# Patient Record
Sex: FEMALE | Race: OTHER | URBAN - METROPOLITAN AREA
[De-identification: names, ages, dates, MRNs, and addresses within clinical notes are randomized per-mention and may not be internally consistent; named-entity substitution may affect disease eponyms.]

---

## 2023-02-23 ENCOUNTER — HOSPITAL ENCOUNTER (OUTPATIENT)
Dept: BONE DENSITY | Age: 78
Discharge: HOME | End: 2023-02-23
Attending: CHIROPRACTOR
Payer: MEDICARE

## 2023-02-23 DIAGNOSIS — Z78.0 POSTMENOPAUSAL STATE: ICD-10-CM

## 2023-02-23 PROCEDURE — 77080 DXA BONE DENSITY AXIAL: CPT | Performed by: CHIROPRACTOR

## 2023-02-25 ENCOUNTER — HOSPITAL ENCOUNTER (OUTPATIENT)
Dept: MAMMOGRAPHY | Age: 78
Discharge: HOME | End: 2023-02-25
Attending: CHIROPRACTOR
Payer: COMMERCIAL

## 2023-02-25 DIAGNOSIS — Z12.31 SCREENING MAMMOGRAM, ENCOUNTER FOR: ICD-10-CM

## 2023-02-25 PROCEDURE — 77067 SCR MAMMO BI INCL CAD: CPT | Performed by: CHIROPRACTOR

## 2023-02-25 PROCEDURE — 77063 BREAST TOMOSYNTHESIS BI: CPT | Performed by: CHIROPRACTOR

## 2023-12-21 ENCOUNTER — OFFICE VISIT (OUTPATIENT)
Dept: FAMILY MEDICINE CLINIC | Facility: CLINIC | Age: 78
End: 2023-12-21

## 2023-12-21 VITALS
DIASTOLIC BLOOD PRESSURE: 68 MMHG | WEIGHT: 144 LBS | SYSTOLIC BLOOD PRESSURE: 104 MMHG | BODY MASS INDEX: 30 KG/M2 | HEART RATE: 68 BPM

## 2023-12-21 DIAGNOSIS — R10.10 PAIN OF UPPER ABDOMEN: Primary | ICD-10-CM

## 2023-12-21 DIAGNOSIS — M81.0 OSTEOPOROSIS, UNSPECIFIED OSTEOPOROSIS TYPE, UNSPECIFIED PATHOLOGICAL FRACTURE PRESENCE: ICD-10-CM

## 2023-12-21 DIAGNOSIS — R68.81 EARLY SATIETY: ICD-10-CM

## 2023-12-21 DIAGNOSIS — R10.13 EPIGASTRIC PAIN: ICD-10-CM

## 2023-12-21 PROCEDURE — 3078F DIAST BP <80 MM HG: CPT | Performed by: FAMILY MEDICINE

## 2023-12-21 PROCEDURE — 3074F SYST BP LT 130 MM HG: CPT | Performed by: FAMILY MEDICINE

## 2023-12-21 PROCEDURE — 1160F RVW MEDS BY RX/DR IN RCRD: CPT | Performed by: FAMILY MEDICINE

## 2023-12-21 PROCEDURE — 1126F AMNT PAIN NOTED NONE PRSNT: CPT | Performed by: FAMILY MEDICINE

## 2023-12-21 PROCEDURE — 1159F MED LIST DOCD IN RCRD: CPT | Performed by: FAMILY MEDICINE

## 2023-12-21 PROCEDURE — 99204 OFFICE O/P NEW MOD 45 MIN: CPT | Performed by: FAMILY MEDICINE

## 2024-01-15 ENCOUNTER — HOSPITAL ENCOUNTER (OUTPATIENT)
Dept: ULTRASOUND IMAGING | Age: 79
Discharge: HOME | End: 2024-01-15
Attending: FAMILY MEDICINE
Payer: MEDICARE

## 2024-01-15 DIAGNOSIS — R10.10 PAIN OF UPPER ABDOMEN: ICD-10-CM

## 2024-01-15 PROCEDURE — 76705 ECHO EXAM OF ABDOMEN: CPT | Performed by: FAMILY MEDICINE

## 2024-01-22 ENCOUNTER — TELEPHONE (OUTPATIENT)
Dept: FAMILY MEDICINE CLINIC | Facility: CLINIC | Age: 79
End: 2024-01-22

## 2024-01-23 NOTE — TELEPHONE ENCOUNTER
I was going to discuss at the visit on 1/26 but her liver, bile ducts, pancreas and kidneys are normal she has a very small gallbladder polyp which should not be causing any pain.  Her gallbladder is noninflamed and there are no stones.

## 2024-01-23 NOTE — TELEPHONE ENCOUNTER
Pt  Nathaniel on LUDY was called and inform of Dr. Jones message below and he verbalized understanding. He will inform pt.      Future Appointments   Date Time Provider Department Center   1/26/2024  8:30 AM [facility]

## 2024-01-26 ENCOUNTER — LAB ENCOUNTER (OUTPATIENT)
Dept: LAB | Age: 79
End: 2024-01-26
Attending: FAMILY MEDICINE
Payer: MEDICARE

## 2024-01-26 ENCOUNTER — OFFICE VISIT (OUTPATIENT)
Dept: FAMILY MEDICINE CLINIC | Facility: CLINIC | Age: 79
End: 2024-01-26

## 2024-01-26 VITALS
DIASTOLIC BLOOD PRESSURE: 66 MMHG | HEART RATE: 64 BPM | WEIGHT: 143 LBS | BODY MASS INDEX: 30 KG/M2 | SYSTOLIC BLOOD PRESSURE: 116 MMHG

## 2024-01-26 DIAGNOSIS — H52.03 HYPEROPIA WITH PRESBYOPIA OF BOTH EYES: ICD-10-CM

## 2024-01-26 DIAGNOSIS — E55.9 VITAMIN D DEFICIENCY: ICD-10-CM

## 2024-01-26 DIAGNOSIS — M81.0 SENILE OSTEOPOROSIS: ICD-10-CM

## 2024-01-26 DIAGNOSIS — H25.9 SENILE CATARACT, UNSPECIFIED AGE-RELATED CATARACT TYPE, UNSPECIFIED LATERALITY: ICD-10-CM

## 2024-01-26 DIAGNOSIS — L98.9 SKIN LESION: ICD-10-CM

## 2024-01-26 DIAGNOSIS — R10.13 EPIGASTRIC PAIN: ICD-10-CM

## 2024-01-26 DIAGNOSIS — H52.4 HYPEROPIA WITH PRESBYOPIA OF BOTH EYES: ICD-10-CM

## 2024-01-26 DIAGNOSIS — Z00.00 ENCOUNTER FOR ANNUAL HEALTH EXAMINATION: ICD-10-CM

## 2024-01-26 DIAGNOSIS — Z23 NEED FOR PNEUMOCOCCAL VACCINE: ICD-10-CM

## 2024-01-26 DIAGNOSIS — H10.45 CHRONIC ALLERGIC CONJUNCTIVITIS: ICD-10-CM

## 2024-01-26 DIAGNOSIS — M54.50 LOW BACK PAIN, UNSPECIFIED BACK PAIN LATERALITY, UNSPECIFIED CHRONICITY, UNSPECIFIED WHETHER SCIATICA PRESENT: ICD-10-CM

## 2024-01-26 DIAGNOSIS — Z00.00 ENCOUNTER FOR ANNUAL HEALTH EXAMINATION: Primary | ICD-10-CM

## 2024-01-26 DIAGNOSIS — H35.359 CYSTOID MACULAR DEGENERATION, UNSPECIFIED LATERALITY: ICD-10-CM

## 2024-01-26 LAB
ALBUMIN SERPL-MCNC: 4.6 G/DL (ref 3.2–4.8)
ALBUMIN/GLOB SERPL: 1.5 {RATIO} (ref 1–2)
ALP LIVER SERPL-CCNC: 103 U/L
ALT SERPL-CCNC: 20 U/L
ANION GAP SERPL CALC-SCNC: 7 MMOL/L (ref 0–18)
AST SERPL-CCNC: 26 U/L (ref ?–34)
BASOPHILS # BLD AUTO: 0.06 X10(3) UL (ref 0–0.2)
BASOPHILS NFR BLD AUTO: 1 %
BILIRUB SERPL-MCNC: 0.8 MG/DL (ref 0.2–1.1)
BUN BLD-MCNC: 20 MG/DL (ref 9–23)
BUN/CREAT SERPL: 23.3 (ref 10–20)
CALCIUM BLD-MCNC: 9.7 MG/DL (ref 8.7–10.4)
CHLORIDE SERPL-SCNC: 106 MMOL/L (ref 98–112)
CHOLEST SERPL-MCNC: 145 MG/DL (ref ?–200)
CO2 SERPL-SCNC: 29 MMOL/L (ref 21–32)
CREAT BLD-MCNC: 0.86 MG/DL
DEPRECATED RDW RBC AUTO: 43.8 FL (ref 35.1–46.3)
EGFRCR SERPLBLD CKD-EPI 2021: 69 ML/MIN/1.73M2 (ref 60–?)
EOSINOPHIL # BLD AUTO: 0.03 X10(3) UL (ref 0–0.7)
EOSINOPHIL NFR BLD AUTO: 0.5 %
ERYTHROCYTE [DISTWIDTH] IN BLOOD BY AUTOMATED COUNT: 11.9 % (ref 11–15)
EST. AVERAGE GLUCOSE BLD GHB EST-MCNC: 111 MG/DL (ref 68–126)
FASTING PATIENT LIPID ANSWER: YES
FASTING STATUS PATIENT QL REPORTED: YES
GLOBULIN PLAS-MCNC: 3.1 G/DL (ref 2.8–4.4)
GLUCOSE BLD-MCNC: 95 MG/DL (ref 70–99)
HBA1C MFR BLD: 5.5 % (ref ?–5.7)
HCT VFR BLD AUTO: 48.3 %
HDLC SERPL-MCNC: 52 MG/DL (ref 40–59)
HGB BLD-MCNC: 15.9 G/DL
IMM GRANULOCYTES # BLD AUTO: 0.02 X10(3) UL (ref 0–1)
IMM GRANULOCYTES NFR BLD: 0.3 %
LDLC SERPL CALC-MCNC: 69 MG/DL (ref ?–100)
LYMPHOCYTES # BLD AUTO: 1.95 X10(3) UL (ref 1–4)
LYMPHOCYTES NFR BLD AUTO: 32.7 %
MCH RBC QN AUTO: 32.6 PG (ref 26–34)
MCHC RBC AUTO-ENTMCNC: 32.9 G/DL (ref 31–37)
MCV RBC AUTO: 99.2 FL
MONOCYTES # BLD AUTO: 0.37 X10(3) UL (ref 0.1–1)
MONOCYTES NFR BLD AUTO: 6.2 %
NEUTROPHILS # BLD AUTO: 3.54 X10 (3) UL (ref 1.5–7.7)
NEUTROPHILS # BLD AUTO: 3.54 X10(3) UL (ref 1.5–7.7)
NEUTROPHILS NFR BLD AUTO: 59.3 %
NONHDLC SERPL-MCNC: 93 MG/DL (ref ?–130)
OSMOLALITY SERPL CALC.SUM OF ELEC: 296 MOSM/KG (ref 275–295)
PLATELET # BLD AUTO: 275 10(3)UL (ref 150–450)
POTASSIUM SERPL-SCNC: 4.5 MMOL/L (ref 3.5–5.1)
PROT SERPL-MCNC: 7.7 G/DL (ref 5.7–8.2)
RBC # BLD AUTO: 4.87 X10(6)UL
SODIUM SERPL-SCNC: 142 MMOL/L (ref 136–145)
TRIGL SERPL-MCNC: 140 MG/DL (ref 30–149)
TSI SER-ACNC: 0.92 MIU/ML (ref 0.55–4.78)
VIT D+METAB SERPL-MCNC: 30.9 NG/ML (ref 30–100)
VLDLC SERPL CALC-MCNC: 21 MG/DL (ref 0–30)
WBC # BLD AUTO: 6 X10(3) UL (ref 4–11)

## 2024-01-26 PROCEDURE — 82306 VITAMIN D 25 HYDROXY: CPT

## 2024-01-26 PROCEDURE — 80061 LIPID PANEL: CPT

## 2024-01-26 PROCEDURE — 85025 COMPLETE CBC W/AUTO DIFF WBC: CPT

## 2024-01-26 PROCEDURE — 84443 ASSAY THYROID STIM HORMONE: CPT

## 2024-01-26 PROCEDURE — 83036 HEMOGLOBIN GLYCOSYLATED A1C: CPT

## 2024-01-26 PROCEDURE — 36415 COLL VENOUS BLD VENIPUNCTURE: CPT

## 2024-01-26 PROCEDURE — 80053 COMPREHEN METABOLIC PANEL: CPT

## 2024-01-26 RX ORDER — PANTOPRAZOLE SODIUM 40 MG/1
40 TABLET, DELAYED RELEASE ORAL
Qty: 20 TABLET | Refills: 0 | Status: SHIPPED | OUTPATIENT
Start: 2024-01-26

## 2024-01-26 RX ORDER — PANTOPRAZOLE SODIUM 40 MG/1
40 TABLET, DELAYED RELEASE ORAL
Qty: 20 TABLET | Refills: 0 | Status: SHIPPED | OUTPATIENT
Start: 2024-01-26 | End: 2024-01-26

## 2024-01-26 NOTE — PROGRESS NOTES
Subjective:   Kait Colvin is a 78 year old female who presents for a Medicare Subsequent Annual Wellness visit (Pt already had Initial Annual Wellness) and scheduled follow up of multiple significant but stable problems.       History/Other:   Fall Risk Assessment:   She has been screened for Falls and is low risk.      Cognitive Assessment:   Abnormal  What day of the week is this?: Correct  What month is it?: Correct  What year is it?: Incorrect  Recall \"Ball\": Correct  Recall \"Flag\": Correct  Recall \"Tree\": Correct    Functional Ability/Status:   Kait Colvin has some abnormal functions as listed below:  She has Driving difficulties based on screening of functional status. She has difficulties Affording Meds based on screening of functional status.       Depression Screening (PHQ-2/PHQ-9): PHQ-2 SCORE: 0  , done 1/26/2024             Advanced Directives:   She does have a Living Will but we do NOT have it on file in Epic.    She does have a POA but we do NOT have it on file in Epic.    Not discussed      Patient Active Problem List   Diagnosis    Cystoid macular degeneration of retina    Low back pain    Senile osteoporosis    Vitamin D deficiency    Chronic allergic conjunctivitis    Senile cataract    Hyperopia with presbyopia of both eyes     Allergies:  She is allergic to chlorpheniramine, dextromethorphan hbr, diphenhydramine, doxylamine, pcns [penicillins], pseudoephedrine hcl, tylenol [acetaminophen], and alendronic acid.    Current Medications:  Outpatient Medications Marked as Taking for the 1/26/24 encounter (Office Visit) with Madelin Jones MD   Medication Sig    pantoprazole 40 MG Oral Tab EC Take 1 tablet (40 mg total) by mouth every morning before breakfast.    Calcium Carbonate-Vitamin D (CALCIUM 600-D) 600-400 MG-UNIT Oral Tab Take  by mouth.       Medical History:  She  has a past medical history of Cataract (2010), Dermatochalasis, Meibomian gland dysfunction (2010),  Ophthalmology follow-up encounter (), and Batsheva.  Surgical History:  She  has a past surgical history that includes Cataract extraction w/  intraocular lens implant; appendectomy; musculoskeletal surgery unlisted (Left); musculoskeletal surgery unlisted (Right); electrocardiogram, complete; and .   Family History:  Her family history is not on file.  Social History:  She  reports that she has never smoked. She has never been exposed to tobacco smoke. She has never used smokeless tobacco. She reports that she does not drink alcohol and does not use drugs.    Tobacco:  She has never smoked tobacco.    CAGE Alcohol Screen:   CAGE screening score of 0 on 2024, showing low risk of alcohol abuse.      Patient Care Team:  Madelin Jones MD as PCP - General (Family Medicine)    Review of Systems     Negative except per hpi     Objective:   Physical Exam  General appearance: alert  HEENT: Normocephalic, without obvious abnormality, atraumatic. PERRL, EOMI, pink conjunctiva.   Neck: supple, symmetrical, trachea midline, no adenopathy, no JVD and thyroid not enlarged,  Lungs: respirations unlabored, clear to auscultation bilaterally  Heart: regular rate and rhythm, S1, S2 normal, no murmur  Abdomen: normoactive bowel sounds x4; soft, non-distended; nontender; no masses  Extremities: extremities normal, atraumatic, no cyanosis or edema; no erythema or tenderness in calves bilaterally  Neurologic: alert, oriented x3      /66   Pulse 64   Wt 143 lb (64.9 kg)   BMI 29.89 kg/m²  Estimated body mass index is 29.89 kg/m² as calculated from the following:    Height as of 16: 4' 10\" (1.473 m).    Weight as of this encounter: 143 lb (64.9 kg).    Medicare Hearing Assessment:   Hearing Screening    Time taken: 2024  8:30 AM  Screening Method: Questionnaire  I have trouble understanding things on the TV: No I have to strain to understand conversations: No   I have to worry about missing the  telephone ring or doorbell: No I have trouble hearing conversations in a noisy background such as a crowded room or restaurant: No   I get confused about where sounds come from: No I misunderstand some words in a sentence and need to ask people to repeat themselves: No   I especially have trouble understanding the speech of women and children: No I have trouble understanding the speaker in a large room such as at a meeting or place of Mandaeism: No   Many people I talk to seem to mumble (or don't speak clearly): No People get annoyed because I misunderstand what they say: No   I misunderstand what others are saying and make inappropriate responses: No I avoid social activities because I cannot hear well and fear I will reply improperly: No   Family members and friends have told me they think I may have hearing loss: No               Vision testing not required for subsequent Medicare annual exam      Assessment & Plan:   Kait Colvin is a 78 year old female who presents for a Medicare Assessment.     1. Encounter for annual health examination (Primary)  -     CBC With Differential With Platelet; Future; Expected date: 01/26/2024  -     Comp Metabolic Panel (14); Future; Expected date: 01/26/2024  -     Hemoglobin A1C; Future; Expected date: 01/26/2024  -     TSH W Reflex To Free T4; Future; Expected date: 01/26/2024  -     Lipid Panel; Future; Expected date: 01/26/2024  -     Vitamin D; Future; Expected date: 01/26/2024  2. Vitamin D deficiency  -     Vitamin D; Future; Expected date: 01/26/2024  3. Senile osteoporosis  - Stable condition, continue present management.    4. Low back pain, unspecified back pain laterality, unspecified chronicity, unspecified whether sciatica present  - Stable condition, continue present management.      5. Senile cataract, unspecified age-related cataract type, unspecified laterality  -     Ophthalmology Referral - In Network  6. Hyperopia with presbyopia of both eyes  -      Ophthalmology Referral - In Network  7. Cystoid macular degeneration, unspecified laterality  -     Ophthalmology Referral - In Network  8. Chronic allergic conjunctivitis  -     Ophthalmology Referral - In Network  9. Epigastric pain  Low fodmap diet  US results reviewed and unremarkable  -     Pantoprazole Sodium; Take 1 tablet (40 mg total) by mouth every morning before breakfast.  Dispense: 20 tablet; Refill: 0  10. Need for pneumococcal vaccine  -     PCV20 VACCINE FOR INTRAMUSCULAR USE  11. Skin lesion  -     Derm Referral - In Network    The patient indicates understanding of these issues and agrees to the plan.  Reinforced healthy diet, lifestyle, and exercise.      No follow-ups on file.     Madelin Jones MD, 1/26/2024     Supplementary Documentation:   General Health:  In the past six months, have you lost more than 10 pounds without trying?: 2 - No  Has your appetite been poor?: No  Type of Diet: Balanced  How does the patient maintain a good energy level?: Appropriate Exercise  How would you describe your daily physical activity?: Light  How would you describe your current health state?: Fair  How do you maintain positive mental well-being?: Visiting Family  On a scale of 0 to 10, with 0 being no pain and 10 being severe pain, what is your pain level?: 2 - (Mild)  In the past six months, have you experienced urine leakage?: 0-No  At any time do you feel concerned for the safety/well-being of yourself and/or your children, in your home or elsewhere?: No  Have you had any immunizations at another office such as Influenza, Hepatitis B, Tetanus, or Pneumococcal?: Yes       Kait Colvin's SCREENING SCHEDULE   Tests on this list are recommended by your physician but may not be covered, or covered at this frequency, by your insurer.   Please check with your insurance carrier before scheduling to verify coverage.   PREVENTATIVE SERVICES FREQUENCY &  COVERAGE DETAILS LAST COMPLETION DATE   Diabetes  Screening    Fasting Blood Sugar /  Glucose    One screening every 12 months if never tested or if previously tested but not diagnosed with pre-diabetes   One screening every 6 months if diagnosed with pre-diabetes Lab Results   Component Value Date    GLUCOSE 92 07/21/2007     (H) 11/19/2015        Cardiovascular Disease Screening    Lipid Panel  Cholesterol  Lipoprotein (HDL)  Triglycerides Covered every 5 years for all Medicare beneficiaries without apparent signs or symptoms of cardiovascular disease Lab Results   Component Value Date    CHOLEST 133 11/19/2015    HDL 39 11/19/2015    LDL 66 11/19/2015    TRIG 142 11/19/2015         Electrocardiogram (EKG)   Covered if needed at Welcome to Medicare, and non-screening if indicated for medical reasons 07/16/2013      Ultrasound Screening for Abdominal Aortic Aneurysm (AAA) Covered once in a lifetime for one of the following risk factors    Men who are 65-75 years old and have ever smoked    Anyone with a family history -     Colorectal Cancer Screening  Covered for ages 50-85; only need ONE of the following:    Colonoscopy   Covered every 10 years    Covered every 2 years if patient is at high risk or previous colonoscopy was abnormal -    No recommendations at this time    Flexible Sigmoidoscopy   Covered every 4 years -    Fecal Occult Blood Test Covered annually -   Bone Density Screening    Bone density screening    Covered every 2 years after age 65 if diagnosed with risk of osteoporosis or estrogen deficiency.    Covered yearly for long-term glucocorticoid medication use (Steroids) Last Dexa Scan:    XR DEXA BONE DENSITOMETRY (CPT=77080) 02/23/2023      No recommendations at this time   Pap and Pelvic    Pap   Covered every 2 years for women at normal risk; Annually if at high risk -  No recommendations at this time    Chlamydia Annually if high risk -  No recommendations at this time   Screening Mammogram    Mammogram     Recommend annually for all  female patients aged 40 and older    One baseline mammogram covered for patients aged 35-39 02/25/2023    No recommendations at this time    Immunizations    Influenza Covered once per flu season  Please get every year -  Influenza Vaccine(1) due on 10/01/2023    Pneumococcal Each vaccine (Syghath75 & Qvzyoywob16) covered once after 65 Prevnar 13: 03/30/2022    [id number]: -     No recommendations at this time    Hepatitis B One screening covered for patients with certain risk factors   -  No recommendations at this time    Tetanus Toxoid Not covered by Medicare Part B unless medically necessary (cut with metal); may be covered with your pharmacy prescription benefits -    Tetanus, Diptheria and Pertusis TD and TDaP Not covered by Medicare Part B -  No recommendations at this time    Zoster Not covered by Medicare Part B; may be covered with your pharmacy  prescription benefits 11/17/2015  No recommendations at this time

## 2024-02-05 ENCOUNTER — TELEPHONE (OUTPATIENT)
Dept: FAMILY MEDICINE CLINIC | Facility: CLINIC | Age: 79
End: 2024-02-05

## 2024-02-05 NOTE — TELEPHONE ENCOUNTER
Using language line : [place] ID number [id number]      Left message for patient's spouse Nathaniel to call back and discuss.

## 2024-02-05 NOTE — TELEPHONE ENCOUNTER
[place] citizen speaking - pts spouse Nathaniel doan LUDY would like a call back to discuss pts recent blood work. Please advise

## 2024-02-06 NOTE — TELEPHONE ENCOUNTER
Patient spouse, Edwin (on LUDY) contacted. (Name and  of pt verified). All results and recommendations reviewed. He verbalizes understanding, denies further questions and agrees with plan of care.        Madelin Jones MD  2024 11:52 AM CST Back to Top      Please inform patient that her liver, kidney, thyroid function, blood counts are normal.  She is not diabetic and her cholesterol looks good.  Vitamin D levels are normal.  Okay to follow-up in 1 year

## 2024-02-07 ENCOUNTER — OFFICE VISIT (OUTPATIENT)
Dept: DERMATOLOGY CLINIC | Facility: CLINIC | Age: 79
End: 2024-02-07

## 2024-02-07 DIAGNOSIS — L81.4 LENTIGINES: Primary | ICD-10-CM

## 2024-02-07 DIAGNOSIS — D49.2 NEOPLASM OF UNSPECIFIED BEHAVIOR OF BONE, SOFT TISSUE, AND SKIN: ICD-10-CM

## 2024-02-07 DIAGNOSIS — L57.0 MULTIPLE ACTINIC KERATOSES: ICD-10-CM

## 2024-02-07 PROCEDURE — 1159F MED LIST DOCD IN RCRD: CPT | Performed by: STUDENT IN AN ORGANIZED HEALTH CARE EDUCATION/TRAINING PROGRAM

## 2024-02-07 PROCEDURE — 99203 OFFICE O/P NEW LOW 30 MIN: CPT | Performed by: STUDENT IN AN ORGANIZED HEALTH CARE EDUCATION/TRAINING PROGRAM

## 2024-02-07 PROCEDURE — 11102 TANGNTL BX SKIN SINGLE LES: CPT | Performed by: STUDENT IN AN ORGANIZED HEALTH CARE EDUCATION/TRAINING PROGRAM

## 2024-02-07 PROCEDURE — 1160F RVW MEDS BY RX/DR IN RCRD: CPT | Performed by: STUDENT IN AN ORGANIZED HEALTH CARE EDUCATION/TRAINING PROGRAM

## 2024-02-07 PROCEDURE — 17003 DESTRUCT PREMALG LES 2-14: CPT | Performed by: STUDENT IN AN ORGANIZED HEALTH CARE EDUCATION/TRAINING PROGRAM

## 2024-02-07 PROCEDURE — 17000 DESTRUCT PREMALG LESION: CPT | Performed by: STUDENT IN AN ORGANIZED HEALTH CARE EDUCATION/TRAINING PROGRAM

## 2024-02-07 PROCEDURE — 88305 TISSUE EXAM BY PATHOLOGIST: CPT | Performed by: STUDENT IN AN ORGANIZED HEALTH CARE EDUCATION/TRAINING PROGRAM

## 2024-02-07 NOTE — PROGRESS NOTES
New Patient    Referred by: No referring provider defined for this encounter.    CHIEF COMPLAINT: Lesion of concern     HISTORY OF PRESENT ILLNESS: Kait Colvin is a 78 year old female here for evaluation of lesion of concern.    1. Growth   Location: Right Upper Cutaneous Lip  Duration: 1 year  Signs and symptoms: Red, flaky, itchy lesion  Current treatment: None  Past treatments: None    Personal Dermatologic History  History of skin cancer: No  History of  atypical moles: No    FAMILY HISTORY:  History of melanoma: No    Past Medical History  Past Medical History:   Diagnosis Date    Cataract 2010    Dermatochalasis     Meibomian gland dysfunction 2010    Ophthalmology follow-up encounter 2013    Post OP CME    Pinguecula        REVIEW OF SYSTEMS:  Constitutional: Denies fever, chills, unintentional weight loss.   Skin as per HPI    Medications  Current Outpatient Medications   Medication Sig Dispense Refill    pantoprazole 40 MG Oral Tab EC Take 1 tablet (40 mg total) by mouth every morning before breakfast. 20 tablet 0    Calcium Carbonate-Vitamin D (CALCIUM 600-D) 600-400 MG-UNIT Oral Tab Take  by mouth.      Omega-3-acid Ethyl Esters (LOVAZA) 1 G Oral Cap Take 1 g by mouth daily. (Patient not taking: Reported on 12/21/2023)         PHYSICAL EXAM:  General: awake, alert, no acute distress  Neuropsych: appropriate mood and affect  Eyes: Sclerae anicteric, without conjunctival injection, eyelids unremarkable  Skin: Skin exam was performed today including the following:faces. Pertinent findings include:   -  nose and R cheek with pink gritty papule  - face with stellate light brown macules  - R upper cutaneous lip eith a pink scaly plaque     ASSESSMENT & PLAN:  Pathophysiology of diagnoses discussed with patient.  Therapeutic options reviewed. Risks, benefits, and alternatives discussed with patient. Instructions reviewed at length.    #Multiple actinic keratoses  - Discussed premalignant etiology and  possibility of transformation to SCC  - Recommended cryotherapy today   - Discussed side effects including redness, swelling, crusting, and discolortion after treatment, wound care with soap/water and vaseline   - Recommend sun protection with spf 30 or higher, sun protective clothing such as wide brimmed hats and long sleeves. Recommend avoiding midday sun (10 am- 3 pm).     - Procedure Note Cryosurgery of pre-malignant lesion(s)  Risks, benefits, alternatives, complications, and personnel required for cryosurgery reviewed with patient. Patient verbalizes understanding and wishes to proceed.   - Cryosurgery performed with Liquid Nitrogen via cryostat spray gun to Actinic Keratosis . 3 lesion(s) treated.   - Patient tolerated well and wound care discussed. Return if lesions fail to fully resolve.    #Lentigines  - Discussed benign appearance and provided reassurance. No treatment but observation at this time. Follow-up for concerning physical changes or new symptoms.  - Recommend sun protection with spf 30 or higher, sun protective clothing such as wide brimmed hats and long sleeves. Recommend avoiding midday sun (10 am- 3 pm).     #Neoplasm(s) of uncertain behavior of skin  - Shave biopsy performed today   - Will notify patient with results and arrange for appropriate definitive treatment, if indicated.      Shave of lesion to establish and confirm diagnosis:  Photo taken: Yes    Risks, benefits, alternatives and personnel required for shave biopsy reviewed with patient. Risks discussed include, but not limited to: pain, bleeding, infection, scar, reaction to anesthetic, and recurrence/need for further treatment.  Patient and physician agree as to site(s) to be biopsied. Patient verbalizes understanding and wishes to proceed.     Site(s) prepped with alcohol and anesthetized with 1% lidocaine with epinephrine.   Shave of lesion(s) performed to the level of the dermis. Specimen(s) from A. R upper cutaneous lip sent  for pathology to r/o SCC 50% ALCL and bandaging applied.   Written and verbal wound care instructions provided to patient, understanding verbalized.    Return to clinic: 3 months or sooner if something concerning arises     Karel Millard MD

## 2024-05-28 ENCOUNTER — OFFICE VISIT (OUTPATIENT)
Dept: DERMATOLOGY CLINIC | Facility: CLINIC | Age: 79
End: 2024-05-28

## 2024-05-28 DIAGNOSIS — L57.0 MULTIPLE ACTINIC KERATOSES: Primary | ICD-10-CM

## 2024-05-28 DIAGNOSIS — L81.4 LENTIGINES: ICD-10-CM

## 2024-05-28 DIAGNOSIS — L82.1 SEBORRHEIC KERATOSES: ICD-10-CM

## 2024-05-28 PROCEDURE — 99213 OFFICE O/P EST LOW 20 MIN: CPT | Performed by: STUDENT IN AN ORGANIZED HEALTH CARE EDUCATION/TRAINING PROGRAM

## 2024-05-28 PROCEDURE — 17003 DESTRUCT PREMALG LES 2-14: CPT | Performed by: STUDENT IN AN ORGANIZED HEALTH CARE EDUCATION/TRAINING PROGRAM

## 2024-05-28 PROCEDURE — 1160F RVW MEDS BY RX/DR IN RCRD: CPT | Performed by: STUDENT IN AN ORGANIZED HEALTH CARE EDUCATION/TRAINING PROGRAM

## 2024-05-28 PROCEDURE — 17000 DESTRUCT PREMALG LESION: CPT | Performed by: STUDENT IN AN ORGANIZED HEALTH CARE EDUCATION/TRAINING PROGRAM

## 2024-05-28 PROCEDURE — 1159F MED LIST DOCD IN RCRD: CPT | Performed by: STUDENT IN AN ORGANIZED HEALTH CARE EDUCATION/TRAINING PROGRAM

## 2024-05-28 NOTE — PROGRESS NOTES
May 28, 2024    Established patient     CHIEF COMPLAINT: Actinic keratosis 3 month f/u    HISTORY OF PRESENT ILLNESS: .    1. Hypertrophic actinic keratosis  Location: R upper cutaneous lip   Duration: 1 year  Signs and symptoms: Improvement  Current treatment: None  Past treatments: Biopsy    2. Spot of concern  Location: Upper L chest   Duration: 2 months  Signs and symptoms: None  Current treatment: None  Past treatments: None        DERM HISTORY:  History of skin cancer: No  History of chronic skin disease/condition: No    FAMILY HISTORY:  History of melanoma: No  History of chronic skin disease/condition: No    History/Other:    REVIEW OF SYSTEMS:  Constitutional: Denies fever, chills, unintentional weight loss.   Skin as per HPI    PAST MEDICAL HISTORY:  Past Medical History:    AK (actinic keratosis)    Cataract    Dermatochalasis    Meibomian gland dysfunction    Ophthalmology follow-up encounter    Post OP CME    Pinguecula       Medications  Current Outpatient Medications   Medication Sig Dispense Refill    pantoprazole 40 MG Oral Tab EC Take 1 tablet (40 mg total) by mouth every morning before breakfast. (Patient not taking: Reported on 2/7/2024) 20 tablet 0    Calcium Carbonate-Vitamin D (CALCIUM 600-D) 600-400 MG-UNIT Oral Tab Take  by mouth.      Omega-3-acid Ethyl Esters (LOVAZA) 1 G Oral Cap Take 1 capsule (1 g total) by mouth daily.         Objective:    PHYSICAL EXAM:  General: awake, alert, no acute distress  Skin: Skin exam was performed today including the following: face and chest. Pertinent findings include:   - chest with stellate brown macules and brown stuck on papules  - Upper cutaneous lip with pink gritty papule  - cheeks with pink gritty papules    ASSESSMENT & PLAN:  Pathophysiology of diagnoses discussed with patient.  Therapeutic options reviewed. Risks, benefits, and alternatives discussed with patient. Instructions reviewed at length.    #Seborrheic Keratoses  - Reassured patient  regarding benign nature of lesions. No treatment but observation at this time. Follow-up for concerning physical changes or new symptoms.    #Lentigines  - Discussed benign appearance and provided reassurance. No treatment but observation at this time. Follow-up for concerning physical changes or new symptoms.  - Recommend sun protection with spf 30 or higher, sun protective clothing such as wide brimmed hats and long sleeves. Recommend avoiding midday sun (10 am- 3 pm).     #Multiple actinic keratoses  - Discussed premalignant etiology and possibility of transformation to SCC  - Recommended cryotherapy today   - Discussed side effects including redness, swelling, crusting, and discolortion after treatment, wound care with soap/water and vaseline   - Recommend sun protection with spf 30 or higher, sun protective clothing such as wide brimmed hats and long sleeves. Recommend avoiding midday sun (10 am- 3 pm).     - Procedure Note Cryosurgery of pre-malignant lesion(s)  Risks, benefits, alternatives, complications, and personnel required for cryosurgery reviewed with patient. Patient verbalizes understanding and wishes to proceed.   - Cryosurgery performed with Liquid Nitrogen via cryostat spray gun to Actinic Keratosis . 4 lesion(s) treated.   - Patient tolerated well and wound care discussed. Return if lesions fail to fully resolve.    Return to clinic: 3 months or sooner if something concerning arises     Karel Millard MD

## 2024-06-11 ENCOUNTER — TELEPHONE (OUTPATIENT)
Dept: FAMILY MEDICINE CLINIC | Facility: CLINIC | Age: 79
End: 2024-06-11

## 2024-06-11 DIAGNOSIS — H35.359 CYSTOID MACULAR DEGENERATION, UNSPECIFIED LATERALITY: ICD-10-CM

## 2024-06-11 DIAGNOSIS — H25.9 SENILE CATARACT, UNSPECIFIED AGE-RELATED CATARACT TYPE, UNSPECIFIED LATERALITY: ICD-10-CM

## 2024-06-11 DIAGNOSIS — H52.03 HYPEROPIA WITH PRESBYOPIA OF BOTH EYES: Primary | ICD-10-CM

## 2024-06-11 DIAGNOSIS — H52.4 HYPEROPIA WITH PRESBYOPIA OF BOTH EYES: Primary | ICD-10-CM

## 2024-06-11 NOTE — TELEPHONE ENCOUNTER
Pt came in stating that she needs a referral for Dr.Ronald BRENT Alvarado an ophthalmologist. Please call patient with more information regarding the referral thank you !     She also needs a second referral for Dr.Wendewessen Kwabena M.D. a retinal specialist. Please call patient with information.

## 2024-07-02 ENCOUNTER — TELEPHONE (OUTPATIENT)
Dept: FAMILY MEDICINE CLINIC | Facility: CLINIC | Age: 79
End: 2024-07-02

## 2024-07-02 NOTE — TELEPHONE ENCOUNTER
Patient in office stating she had to cancel her appointment with Ophthalmologist due to referral not placed. Informed patient both referrals were placed but showing open status. Patient will also be follow up with insurance. MC please advise.

## 2024-08-15 ENCOUNTER — NURSE TRIAGE (OUTPATIENT)
Dept: FAMILY MEDICINE CLINIC | Facility: CLINIC | Age: 79
End: 2024-08-15

## 2024-08-15 ENCOUNTER — HOSPITAL ENCOUNTER (OUTPATIENT)
Age: 79
Discharge: HOME | End: 2024-08-15
Payer: MEDICARE

## 2024-08-15 VITALS
SYSTOLIC BLOOD PRESSURE: 140 MMHG | DIASTOLIC BLOOD PRESSURE: 53 MMHG | HEART RATE: 75 BPM | TEMPERATURE: 98 F | RESPIRATION RATE: 24 BRPM | OXYGEN SATURATION: 100 %

## 2024-08-15 DIAGNOSIS — H92.01 OTALGIA OF RIGHT EAR: ICD-10-CM

## 2024-08-15 DIAGNOSIS — K04.7 DENTAL INFECTION: Primary | ICD-10-CM

## 2024-08-15 RX ORDER — AZITHROMYCIN 250 MG/1
TABLET, FILM COATED ORAL
Qty: 6 TABLET | Refills: 0 | Status: SHIPPED | OUTPATIENT
Start: 2024-08-15 | End: 2024-08-20

## 2024-08-15 NOTE — TELEPHONE ENCOUNTER
Action Requested: Summary for Provider     []  Critical Lab, Recommendations Needed  [] Need Additional Advice  [x]   FYI    []   Need Orders  [] Need Medications Sent to Pharmacy  []  Other     SUMMARY: Spoke with patient's  per LUDY, Date of Birth verified.  He was offered  but declined.  He req appt for patient today, he stated patient has right ear pain started yesterday, worse today, has swelling bottom area.   Patient is negative for redness, fever, blurred vision, no other symptom.  He was advised to take patient to IC or if symptom is severe to ER, he agreed and stated understanding.   He will take patient to IC ADO.       Reason for call: ear pain  Onset: 1-2 days      Reason for Disposition   Patient wants to be seen    Protocols used: Earache-A-[place]    Future Appointments   Date Time Provider Department Center   12/3/2024  9:30 AM Karel Millard MD [facility]

## 2024-08-15 NOTE — ED PROVIDER NOTES
Patient Seen in: [facility]      History     Chief Complaint   Patient presents with    Ear Pain     Stated Complaint: ear pain (rt)    Subjective: This is a 78-year-old female, [place]-speaking only,  [id number] used to obtain history.  Patient arrives to immediate care with complaints of right ear pain x 2 days.  Patient has not been taking any medication to alleviate her ear pain.  Notes that she is allergic to Tylenol.  No recent or current viral symptoms of: Cough, congestion, runny nose, sore throat.  No recent airplane travel.  No recent swimming or submerging her head underwater.  No drainage or discharge from ear.  Patient has hearing issues at baseline which have not changed.  No dental pain, abscess.  Patient does complain of jaw pain, slightly swollen to mandible.  No fever, chills, fatigue.  Well-appearing.  AOx4.  The history is provided by the patient and the spouse. The history is limited by a language barrier. A  was used ([id number]).           Objective:   Past Medical History:    AK (actinic keratosis)    Cataract    Dermatochalasis    Meibomian gland dysfunction    Ophthalmology follow-up encounter    Post OP CME    Pinguecula              Past Surgical History:   Procedure Laterality Date    Appendectomy            x 4    Cataract extraction w/  intraocular lens implant      Electrocardiogram, complete      Scanned to Media Tab    Musculoskeletal surgery unlisted Left     Arthrocentesis Knee joint    Musculoskeletal surgery unlisted Right     Arthrocentesis knee lateral joint line, knee                Social History     Socioeconomic History    Marital status:     Number of children: 4   Occupational History    Occupation: Retired   Tobacco Use    Smoking status: Never     Passive exposure: Never    Smokeless tobacco: Never   Vaping Use    Vaping status: Never Used   Substance and Sexual Activity    Alcohol use: No    Drug use: Never    Other Topics Concern    Caffeine Concern No    History of tanning Yes    Outdoor occupation No    Reaction to local anesthetic No    Pt has a pacemaker No    Pt has a defibrillator No              Review of Systems   Constitutional: Negative.  Negative for activity change, appetite change, chills, fatigue and fever.   HENT:  Positive for ear pain. Negative for congestion, dental problem, hearing loss, postnasal drip, rhinorrhea, sinus pressure, sinus pain, sneezing, sore throat, tinnitus, trouble swallowing and voice change.    Respiratory: Negative.     Cardiovascular: Negative.    Musculoskeletal: Negative.    Skin: Negative.    Neurological: Negative.        Positive for stated Chief Complaint: Ear Pain    Other systems are as noted in HPI.  Constitutional and vital signs reviewed.      All other systems reviewed and negative except as noted above.    Physical Exam     ED Triage Vitals [08/15/24 1428]   /53   Pulse 75   Resp 24   Temp 97.5 °F (36.4 °C)   Temp src Temporal   SpO2 100 %   O2 Device None (Room air)       Current Vitals:   Vital Signs  BP: 140/53 (took twice)  Pulse: 75  Resp: 24  Temp: 97.5 °F (36.4 °C)  Temp src: Temporal    Oxygen Therapy  SpO2: 100 %  O2 Device: None (Room air)            Physical Exam  Constitutional:       General: She is not in acute distress.     Appearance: Normal appearance. She is not ill-appearing, toxic-appearing or diaphoretic.   HENT:      Head: Normocephalic.      Jaw: Tenderness and swelling present. No trismus, pain on movement or malocclusion.        Right Ear: Tympanic membrane, ear canal and external ear normal.      Left Ear: Tympanic membrane, ear canal and external ear normal.      Nose: Nose normal.      Mouth/Throat:      Lips: Pink.      Mouth: Mucous membranes are moist.      Dentition: Abnormal dentition. Dental tenderness and dental caries present. No gingival swelling, dental abscesses or gum lesions.      Pharynx: Oropharynx is clear. No  posterior oropharyngeal erythema.     Eyes:      Extraocular Movements: Extraocular movements intact.      Pupils: Pupils are equal, round, and reactive to light.   Cardiovascular:      Rate and Rhythm: Normal rate and regular rhythm.      Pulses: Normal pulses.      Heart sounds: Normal heart sounds.   Pulmonary:      Effort: Pulmonary effort is normal.      Breath sounds: Normal breath sounds.   Musculoskeletal:         General: Normal range of motion.      Cervical back: Normal range of motion.   Lymphadenopathy:      Cervical: No cervical adenopathy.   Skin:     General: Skin is warm.      Capillary Refill: Capillary refill takes less than 2 seconds.   Neurological:      General: No focal deficit present.      Mental Status: She is alert and oriented to person, place, and time.               ED Course   Labs Reviewed - No data to display                   MDM        Differentials considered include: AOM, otitis externa, OME, cerumen impaction, foreign body, TMJ, dental infection.    Patient right TM visualized.  Good landmarks and light reflex.  No erythema, bulging, perforation, retraction.  No foreign body or cerumen in middle ear canal.  There is no erythema of pinna or tragus.  No pain or manipulation of pinna or tragus.  No drainage or discharge in ear canal.  No AOM, OME, otitis externa.    Patient does have several teeth and state of decay.  Abnormal dentition without abscess.  Positive dental tenderness to percussion to right lower molars.    Patient does have swelling at angle of mandible with slight tenderness.  No TMJ concerns, patient able to fully open and close mouth without any limitation, grinding, clicking etc.  No malocclusion.    You  to discuss all results with patient and .  Strongly encourage patient to start taking something for pain as she has not been taking anything for the past 48 hours.  Patient allergic to Tylenol, encourage NSAIDs: Motrin, Advil, leaf,  ibuprofen.  Patient is aware to apply ice to corner of jaw 4 times a day for least 15 minutes.    Will prescribe antibiotics for dental infection.  Patient is allergic to penicillins.  Z-Garry prescribed.  She is aware to follow-up with her dentist.  Also encourage patient to avoid chewing on that side.    Patient is aware of signs and's that warrant immediate ER evaluation.  All questions answered at time of discharge.  AOx4                                 Medical Decision Making      Disposition and Plan     Clinical Impression:  1. Dental infection    2. Otalgia of right ear         Disposition:  Discharge  8/15/2024  3:07 pm    Follow-up:  Madelin Jones MD  [address] [place] [address]  [address]  [facility] [address]  [phone]      As needed    Villa Fitzpatrick MD  [address] [facility] [address]  [facility] [address]  [phone]    Schedule an appointment as soon as possible for a visit             Medications Prescribed:  Discharge Medication List as of 8/15/2024  3:07 PM        START taking these medications    Details   azithromycin (ZITHROMAX Z-GARRY) 250 MG Oral Tab 500 mg once followed by 250 mg daily x 4 days, Normal, Disp-6 tablet, R-0

## 2024-08-15 NOTE — DISCHARGE INSTRUCTIONS
[place] se ha comentado, no tiene sarkis infección de oído Creo que el dolor de oído proviene de sarkis infección dental Antibióticos recetados Azitromicina [place] joanna medicamento diariamente socrates 4 días [place] REBECCA: Motrin, Advil, Aleve, ibuprofeno cada 6 horas para el dolor y la hinchazón Rosado dolor no mejorará si no joseph ningún medicamento Aplique hielo 4 veces al día socrates 15 minutos en la mandíbula Evite masticar alimentos de joanna lado Por favor, rosemary un seguimiento con rosado dentista    Acuda a la ty de emergencias si hay empeoramiento de la hinchazón, el dolor no mejora, fiebre, secreción, secreción

## 2024-12-17 ENCOUNTER — TELEPHONE (OUTPATIENT)
Dept: FAMILY MEDICINE CLINIC | Facility: CLINIC | Age: 79
End: 2024-12-17

## 2024-12-17 DIAGNOSIS — H35.359 CYSTOID MACULAR DEGENERATION, UNSPECIFIED LATERALITY: ICD-10-CM

## 2024-12-17 DIAGNOSIS — H52.03 HYPEROPIA WITH PRESBYOPIA OF BOTH EYES: Primary | ICD-10-CM

## 2024-12-17 DIAGNOSIS — H52.4 HYPEROPIA WITH PRESBYOPIA OF BOTH EYES: Primary | ICD-10-CM

## 2024-12-17 DIAGNOSIS — H25.9 SENILE CATARACT, UNSPECIFIED AGE-RELATED CATARACT TYPE, UNSPECIFIED LATERALITY: ICD-10-CM

## 2024-12-17 NOTE — TELEPHONE ENCOUNTER
Patient came into [facility] requesting another referral for Dr. Chi Alvarado. Patient has an appt with him January 13th at 11 AM. Please advise and call patient with more information.

## 2024-12-17 NOTE — TELEPHONE ENCOUNTER
Per fd staff patient offered multiple same day appointments and patient refused. Patient to call and reschedule within provider's availability.

## 2024-12-19 ENCOUNTER — OFFICE VISIT (OUTPATIENT)
Dept: DERMATOLOGY CLINIC | Facility: CLINIC | Age: 79
End: 2024-12-19

## 2024-12-19 DIAGNOSIS — L57.0 MULTIPLE ACTINIC KERATOSES: Primary | ICD-10-CM

## 2024-12-19 PROCEDURE — 1159F MED LIST DOCD IN RCRD: CPT | Performed by: STUDENT IN AN ORGANIZED HEALTH CARE EDUCATION/TRAINING PROGRAM

## 2024-12-19 PROCEDURE — 17003 DESTRUCT PREMALG LES 2-14: CPT | Performed by: STUDENT IN AN ORGANIZED HEALTH CARE EDUCATION/TRAINING PROGRAM

## 2024-12-19 PROCEDURE — 1160F RVW MEDS BY RX/DR IN RCRD: CPT | Performed by: STUDENT IN AN ORGANIZED HEALTH CARE EDUCATION/TRAINING PROGRAM

## 2024-12-19 PROCEDURE — 17000 DESTRUCT PREMALG LESION: CPT | Performed by: STUDENT IN AN ORGANIZED HEALTH CARE EDUCATION/TRAINING PROGRAM

## 2024-12-19 PROCEDURE — 99214 OFFICE O/P EST MOD 30 MIN: CPT | Performed by: STUDENT IN AN ORGANIZED HEALTH CARE EDUCATION/TRAINING PROGRAM

## 2024-12-19 NOTE — PROGRESS NOTES
December 19, 2024    Established patient     CHIEF COMPLAINT: Actinic keratosis F/U    HISTORY OF PRESENT ILLNESS: .    1. AK  Location: R upper cutaneous lip   Duration: 1 year  Signs and symptoms: Rough, pink  Signs and symptoms: Improvement  Current treatment: None  Past treatments: Biopsy, Cryo     2. AKs  Location: Cheeks    Duration: Several months   Signs and symptoms: Rough, pink  Condition Update: No change  Current treatment: None  Past treatments: None    DERM HISTORY:  History of skin cancer: No  History of chronic skin disease/condition: No    FAMILY HISTORY:  History of melanoma: No  History of chronic skin disease/condition: No    History/Other:    REVIEW OF SYSTEMS:  Constitutional: Denies fever, chills, unintentional weight loss.   Skin as per HPI    PAST MEDICAL HISTORY:  Past Medical History:    AK (actinic keratosis)    Cataract    Dermatochalasis    Meibomian gland dysfunction    Ophthalmology follow-up encounter    Post OP CME    Pinguecula       Medications  Current Outpatient Medications   Medication Sig Dispense Refill    pantoprazole 40 MG Oral Tab EC Take 1 tablet (40 mg total) by mouth every morning before breakfast. (Patient not taking: Reported on 2/7/2024) 20 tablet 0    Calcium Carbonate-Vitamin D (CALCIUM 600-D) 600-400 MG-UNIT Oral Tab Take  by mouth.      Omega-3-acid Ethyl Esters (LOVAZA) 1 G Oral Cap Take 1 capsule (1 g total) by mouth daily.         Objective:    PHYSICAL EXAM:  General: awake, alert, no acute distress  Skin: Skin exam was performed today including the following: face and chest. Pertinent findings include:   - face with stellate brown macules  - lip and cheeks with pink gritty papules    ASSESSMENT & PLAN:  Pathophysiology of diagnoses discussed with patient.  Therapeutic options reviewed. Risks, benefits, and alternatives discussed with patient. Instructions reviewed at length.    #Lentigines  - Discussed benign appearance and provided reassurance. No treatment  but observation at this time. Follow-up for concerning physical changes or new symptoms.  - Recommend sun protection with spf 30 or higher, sun protective clothing such as wide brimmed hats and long sleeves. Recommend avoiding midday sun (10 am- 3 pm).     #Multiple actinic keratoses  - Discussed premalignant etiology and possibility of transformation to SCC  - Recommended cryotherapy today   - Discussed side effects including redness, swelling, crusting, and discolortion after treatment, wound care with soap/water and vaseline   - Recommend sun protection with spf 30 or higher, sun protective clothing such as wide brimmed hats and long sleeves. Recommend avoiding midday sun (10 am- 3 pm).     - Procedure Note Cryosurgery of pre-malignant lesion(s)  Risks, benefits, alternatives, complications, and personnel required for cryosurgery reviewed with patient. Patient verbalizes understanding and wishes to proceed.   - Cryosurgery performed with Liquid Nitrogen via cryostat spray gun to Actinic Keratosis . 3 lesion(s) treated.   - Patient tolerated well and wound care discussed. Return if lesions fail to fully resolve.    Return to clinic: 2-3 months or sooner if something concerning arises     Karel Millard MD

## 2024-12-19 NOTE — TELEPHONE ENCOUNTER
1. Hyperopia with presbyopia of both eyes  - Ophthalmology Referral - In Network    2. Senile cataract, unspecified age-related cataract type, unspecified laterality  - Ophthalmology Referral - In Network    3. Cystoid macular degeneration, unspecified laterality  - Ophthalmology Referral - In Network

## 2025-03-31 ENCOUNTER — TELEPHONE (OUTPATIENT)
Dept: FAMILY MEDICINE CLINIC | Facility: CLINIC | Age: 80
End: 2025-03-31

## 2025-03-31 DIAGNOSIS — H52.03 HYPEROPIA WITH PRESBYOPIA OF BOTH EYES: ICD-10-CM

## 2025-03-31 DIAGNOSIS — H25.9 SENILE CATARACT, UNSPECIFIED AGE-RELATED CATARACT TYPE, UNSPECIFIED LATERALITY: Primary | ICD-10-CM

## 2025-03-31 DIAGNOSIS — H10.45 CHRONIC ALLERGIC CONJUNCTIVITIS: ICD-10-CM

## 2025-03-31 DIAGNOSIS — H04.123 DRY EYES: ICD-10-CM

## 2025-03-31 DIAGNOSIS — H52.4 HYPEROPIA WITH PRESBYOPIA OF BOTH EYES: ICD-10-CM

## 2025-03-31 NOTE — TELEPHONE ENCOUNTER
Patient came to office to  referral for Chi Iqbal patient stated that address on referrals is  incorrect, address is [address] [facility] [address], please call patient ones entered or if anything else is needed.

## 2025-04-01 ENCOUNTER — MED REC SCAN ONLY (OUTPATIENT)
Dept: FAMILY MEDICINE CLINIC | Facility: CLINIC | Age: 80
End: 2025-04-01

## 2025-04-01 NOTE — TELEPHONE ENCOUNTER
1. Senile cataract, unspecified age-related cataract type, unspecified laterality    - OPHTHALMOLOGY - INTERNAL    2. Hyperopia with presbyopia of both eyes    - OPHTHALMOLOGY - INTERNAL    3. Chronic allergic conjunctivitis    - OPHTHALMOLOGY - INTERNAL

## 2025-04-03 NOTE — TELEPHONE ENCOUNTER
Referral faxed back to Dr. Alvarado' office as requested but still in open status. MC please advise.

## 2025-04-07 NOTE — TELEPHONE ENCOUNTER
Patient in office stating her appointment is on 4/14 and was advised no referral received. Informed patent referral has been faxed multiple times and they are probably referring to referral not being authorized.  number provided to patient to follow up.

## 2025-08-26 ENCOUNTER — OFFICE VISIT (OUTPATIENT)
Dept: FAMILY MEDICINE CLINIC | Facility: CLINIC | Age: 80
End: 2025-08-26

## 2025-08-26 VITALS
BODY MASS INDEX: 30 KG/M2 | HEART RATE: 65 BPM | WEIGHT: 145 LBS | SYSTOLIC BLOOD PRESSURE: 105 MMHG | DIASTOLIC BLOOD PRESSURE: 67 MMHG

## 2025-08-26 DIAGNOSIS — H52.03 HYPEROPIA WITH PRESBYOPIA OF BOTH EYES: ICD-10-CM

## 2025-08-26 DIAGNOSIS — H25.9 SENILE CATARACT, UNSPECIFIED AGE-RELATED CATARACT TYPE, UNSPECIFIED LATERALITY: ICD-10-CM

## 2025-08-26 DIAGNOSIS — E55.9 VITAMIN D DEFICIENCY: ICD-10-CM

## 2025-08-26 DIAGNOSIS — M81.0 SENILE OSTEOPOROSIS: ICD-10-CM

## 2025-08-26 DIAGNOSIS — M25.561 CHRONIC PAIN OF BOTH KNEES: ICD-10-CM

## 2025-08-26 DIAGNOSIS — Z00.00 ENCOUNTER FOR ANNUAL HEALTH EXAMINATION: Primary | ICD-10-CM

## 2025-08-26 DIAGNOSIS — M25.562 CHRONIC PAIN OF BOTH KNEES: ICD-10-CM

## 2025-08-26 DIAGNOSIS — H35.359 CYSTOID MACULAR DEGENERATION, UNSPECIFIED LATERALITY: ICD-10-CM

## 2025-08-26 DIAGNOSIS — H52.4 HYPEROPIA WITH PRESBYOPIA OF BOTH EYES: ICD-10-CM

## 2025-08-26 DIAGNOSIS — D51.9 ANEMIA DUE TO VITAMIN B12 DEFICIENCY, UNSPECIFIED B12 DEFICIENCY TYPE: ICD-10-CM

## 2025-08-26 DIAGNOSIS — H10.45 CHRONIC ALLERGIC CONJUNCTIVITIS: ICD-10-CM

## 2025-08-26 DIAGNOSIS — G89.29 CHRONIC PAIN OF BOTH KNEES: ICD-10-CM

## 2025-08-26 PROCEDURE — G0439 PPPS, SUBSEQ VISIT: HCPCS | Performed by: FAMILY MEDICINE

## 2025-08-26 PROCEDURE — 1160F RVW MEDS BY RX/DR IN RCRD: CPT | Performed by: FAMILY MEDICINE

## 2025-08-26 PROCEDURE — 1170F FXNL STATUS ASSESSED: CPT | Performed by: FAMILY MEDICINE

## 2025-08-26 PROCEDURE — 1159F MED LIST DOCD IN RCRD: CPT | Performed by: FAMILY MEDICINE

## 2025-08-26 PROCEDURE — 3074F SYST BP LT 130 MM HG: CPT | Performed by: FAMILY MEDICINE

## 2025-08-26 PROCEDURE — 1125F AMNT PAIN NOTED PAIN PRSNT: CPT | Performed by: FAMILY MEDICINE

## 2025-08-26 PROCEDURE — 3078F DIAST BP <80 MM HG: CPT | Performed by: FAMILY MEDICINE

## 2025-08-26 PROCEDURE — 96160 PT-FOCUSED HLTH RISK ASSMT: CPT | Performed by: FAMILY MEDICINE

## 2025-08-27 ENCOUNTER — LAB ENCOUNTER (OUTPATIENT)
Dept: LAB | Age: 80
End: 2025-08-27
Attending: FAMILY MEDICINE

## 2025-08-27 ENCOUNTER — HOSPITAL ENCOUNTER (OUTPATIENT)
Dept: GENERAL RADIOLOGY | Age: 80
Discharge: HOME | End: 2025-08-27
Attending: FAMILY MEDICINE

## 2025-08-27 DIAGNOSIS — M25.562 CHRONIC PAIN OF BOTH KNEES: ICD-10-CM

## 2025-08-27 DIAGNOSIS — G89.29 CHRONIC PAIN OF BOTH KNEES: ICD-10-CM

## 2025-08-27 DIAGNOSIS — M25.561 CHRONIC PAIN OF BOTH KNEES: ICD-10-CM

## 2025-08-27 DIAGNOSIS — Z00.00 ENCOUNTER FOR ANNUAL HEALTH EXAMINATION: ICD-10-CM

## 2025-08-27 LAB
ALBUMIN SERPL-MCNC: 4.5 G/DL (ref 3.2–4.8)
ALBUMIN/GLOB SERPL: 1.7 (ref 1–2)
ALP LIVER SERPL-CCNC: 96 U/L (ref 55–142)
ALT SERPL-CCNC: 22 U/L (ref 10–49)
ANION GAP SERPL CALC-SCNC: 6 MMOL/L (ref 0–18)
AST SERPL-CCNC: 27 U/L (ref ?–34)
BASOPHILS # BLD AUTO: 0.05 X10(3) UL (ref 0–0.2)
BASOPHILS NFR BLD AUTO: 0.8 %
BILIRUB SERPL-MCNC: 0.8 MG/DL (ref 0.2–1.1)
BUN BLD-MCNC: 17 MG/DL (ref 9–23)
BUN/CREAT SERPL: 17.7 (ref 10–20)
CALCIUM BLD-MCNC: 9.3 MG/DL (ref 8.7–10.4)
CHLORIDE SERPL-SCNC: 105 MMOL/L (ref 98–112)
CHOLEST SERPL-MCNC: 148 MG/DL (ref ?–200)
CO2 SERPL-SCNC: 29 MMOL/L (ref 21–32)
CREAT BLD-MCNC: 0.96 MG/DL (ref 0.55–1.02)
DEPRECATED RDW RBC AUTO: 44.2 FL (ref 35.1–46.3)
EGFRCR SERPLBLD CKD-EPI 2021: 60 ML/MIN/1.73M2 (ref 60–?)
EOSINOPHIL # BLD AUTO: 0.05 X10(3) UL (ref 0–0.7)
EOSINOPHIL NFR BLD AUTO: 0.8 %
ERYTHROCYTE [DISTWIDTH] IN BLOOD BY AUTOMATED COUNT: 11.8 % (ref 11–15)
EST. AVERAGE GLUCOSE BLD GHB EST-MCNC: 111 MG/DL (ref 68–126)
FASTING PATIENT LIPID ANSWER: YES
FASTING STATUS PATIENT QL REPORTED: YES
GLOBULIN PLAS-MCNC: 2.6 G/DL (ref 2–3.5)
GLUCOSE BLD-MCNC: 91 MG/DL (ref 70–99)
HBA1C MFR BLD: 5.5 % (ref ?–5.7)
HCT VFR BLD AUTO: 45.7 % (ref 35–48)
HDLC SERPL-MCNC: 52 MG/DL (ref 40–59)
HGB BLD-MCNC: 15.4 G/DL (ref 12–16)
IMM GRANULOCYTES # BLD AUTO: 0.02 X10(3) UL (ref 0–1)
IMM GRANULOCYTES NFR BLD: 0.3 %
LDLC SERPL CALC-MCNC: 72 MG/DL (ref ?–100)
LYMPHOCYTES # BLD AUTO: 1.92 X10(3) UL (ref 1–4)
LYMPHOCYTES NFR BLD AUTO: 31.6 %
MCH RBC QN AUTO: 34 PG (ref 26–34)
MCHC RBC AUTO-ENTMCNC: 33.7 G/DL (ref 31–37)
MCV RBC AUTO: 100.9 FL (ref 80–100)
MONOCYTES # BLD AUTO: 0.44 X10(3) UL (ref 0.1–1)
MONOCYTES NFR BLD AUTO: 7.2 %
NEUTROPHILS # BLD AUTO: 3.59 X10 (3) UL (ref 1.5–7.7)
NEUTROPHILS # BLD AUTO: 3.59 X10(3) UL (ref 1.5–7.7)
NEUTROPHILS NFR BLD AUTO: 59.3 %
NONHDLC SERPL-MCNC: 96 MG/DL (ref ?–130)
OSMOLALITY SERPL CALC.SUM OF ELEC: 291 MOSM/KG (ref 275–295)
PLATELET # BLD AUTO: 253 10(3)UL (ref 150–450)
POTASSIUM SERPL-SCNC: 4.3 MMOL/L (ref 3.5–5.1)
PROT SERPL-MCNC: 7.1 G/DL (ref 5.7–8.2)
RBC # BLD AUTO: 4.53 X10(6)UL (ref 3.8–5.3)
SODIUM SERPL-SCNC: 140 MMOL/L (ref 136–145)
TRIGL SERPL-MCNC: 139 MG/DL (ref 30–149)
TSI SER-ACNC: 0.71 UIU/ML (ref 0.55–4.78)
VLDLC SERPL CALC-MCNC: 21 MG/DL (ref 0–30)
WBC # BLD AUTO: 6.1 X10(3) UL (ref 4–11)

## 2025-08-27 PROCEDURE — 73565 X-RAY EXAM OF KNEES: CPT | Performed by: FAMILY MEDICINE

## 2025-08-27 PROCEDURE — 84443 ASSAY THYROID STIM HORMONE: CPT

## 2025-08-27 PROCEDURE — 83036 HEMOGLOBIN GLYCOSYLATED A1C: CPT

## 2025-08-27 PROCEDURE — 36415 COLL VENOUS BLD VENIPUNCTURE: CPT

## 2025-08-27 PROCEDURE — 85025 COMPLETE CBC W/AUTO DIFF WBC: CPT

## 2025-08-27 PROCEDURE — 80061 LIPID PANEL: CPT

## 2025-08-27 PROCEDURE — 80053 COMPREHEN METABOLIC PANEL: CPT
